# Patient Record
Sex: FEMALE | Race: WHITE
[De-identification: names, ages, dates, MRNs, and addresses within clinical notes are randomized per-mention and may not be internally consistent; named-entity substitution may affect disease eponyms.]

---

## 2017-03-22 ENCOUNTER — HOSPITAL ENCOUNTER (OUTPATIENT)
Dept: HOSPITAL 84 - D.ER | Age: 57
Setting detail: OBSERVATION
LOS: 1 days | Discharge: HOME | End: 2017-03-23
Attending: FAMILY MEDICINE | Admitting: FAMILY MEDICINE
Payer: MEDICARE

## 2017-03-22 VITALS — SYSTOLIC BLOOD PRESSURE: 131 MMHG | DIASTOLIC BLOOD PRESSURE: 81 MMHG

## 2017-03-22 VITALS — SYSTOLIC BLOOD PRESSURE: 153 MMHG | DIASTOLIC BLOOD PRESSURE: 95 MMHG

## 2017-03-22 VITALS — BODY MASS INDEX: 34.59 KG/M2 | BODY MASS INDEX: 34.59 KG/M2 | WEIGHT: 195.21 LBS | HEIGHT: 63 IN

## 2017-03-22 DIAGNOSIS — G89.29: ICD-10-CM

## 2017-03-22 DIAGNOSIS — E03.9: ICD-10-CM

## 2017-03-22 DIAGNOSIS — Z72.0: ICD-10-CM

## 2017-03-22 DIAGNOSIS — G47.33: ICD-10-CM

## 2017-03-22 DIAGNOSIS — M79.7: ICD-10-CM

## 2017-03-22 DIAGNOSIS — F31.9: ICD-10-CM

## 2017-03-22 DIAGNOSIS — J44.9: ICD-10-CM

## 2017-03-22 DIAGNOSIS — I10: Primary | ICD-10-CM

## 2017-03-22 DIAGNOSIS — F60.3: ICD-10-CM

## 2017-03-22 DIAGNOSIS — R00.2: ICD-10-CM

## 2017-03-22 DIAGNOSIS — F41.9: ICD-10-CM

## 2017-03-22 LAB
ANION GAP SERPL CALC-SCNC: 11.1 MMOL/L (ref 8–16)
APPEARANCE UR: CLEAR
BACTERIA #/AREA URNS HPF: (no result) /HPF
BASOPHILS NFR BLD AUTO: 0.4 % (ref 0–2)
BILIRUB SERPL-MCNC: NEGATIVE MG/DL
BUN SERPL-MCNC: 11 MG/DL (ref 7–18)
CALCIUM SERPL-MCNC: 8.9 MG/DL (ref 8.5–10.1)
CHLORIDE SERPL-SCNC: 103 MMOL/L (ref 98–107)
CK MB SERPL-MCNC: 0 U/L (ref 0–3.6)
CK MB SERPL-MCNC: 0.2 U/L (ref 0–3.6)
CK SERPL-CCNC: 15 UL (ref 21–215)
CK SERPL-CCNC: 20 UL (ref 21–215)
CK SERPL-CCNC: 40 UL (ref 21–215)
CO2 SERPL-SCNC: 33 MMOL/L (ref 21–32)
COLOR UR: YELLOW
CREAT SERPL-MCNC: 0.7 MG/DL (ref 0.6–1.3)
EOSINOPHIL NFR BLD: 2 % (ref 0–7)
ERYTHROCYTE [DISTWIDTH] IN BLOOD BY AUTOMATED COUNT: 13.5 % (ref 11.5–14.5)
GLUCOSE SERPL-MCNC: 105 MG/DL (ref 74–106)
GLUCOSE SERPL-MCNC: NEGATIVE MG/DL
HCT VFR BLD CALC: 39.5 % (ref 36–48)
HGB BLD-MCNC: 12.5 G/DL (ref 12–16)
IMM GRANULOCYTES NFR BLD: 0.2 % (ref 0–5)
KETONES UR STRIP-MCNC: NEGATIVE MG/DL
LEUKOCYTE ESTERASE: (no result)
LYMPHOCYTES NFR BLD AUTO: 26.7 % (ref 15–50)
MCH RBC QN AUTO: 30.4 PG (ref 26–34)
MCHC RBC AUTO-ENTMCNC: 31.6 G/DL (ref 31–37)
MCV RBC: 96.1 FL (ref 80–100)
MONOCYTES NFR BLD: 6.5 % (ref 2–11)
NEUTROPHILS NFR BLD AUTO: 64.2 % (ref 40–80)
NITRITE UR-MCNC: NEGATIVE MG/ML
OSMOLALITY SERPL CALC.SUM OF ELEC: 283 MOSM/KG (ref 275–300)
PH UR STRIP: 7 [PH] (ref 5–6)
PLATELET # BLD: 129 10X3/UL (ref 130–400)
PMV BLD AUTO: 11.1 FL (ref 7.4–10.4)
POTASSIUM SERPL-SCNC: 4.1 MMOL/L (ref 3.5–5.1)
PROT UR-MCNC: NEGATIVE MG/DL
RBC # BLD AUTO: 4.11 10X6/UL (ref 4–5.4)
RBC #/AREA URNS HPF: (no result) /HPF (ref 0–5)
SODIUM SERPL-SCNC: 143 MMOL/L (ref 136–145)
SP GR UR STRIP: 1.01 (ref 1–1.02)
TROPONIN I SERPL-MCNC: < 0.017 NG/ML (ref 0–0.06)
UROBILINOGEN UR-MCNC: NORMAL MG/DL
WBC # BLD AUTO: 5.1 10X3/UL (ref 4.8–10.8)
WBC #/AREA URNS HPF: (no result) /HPF (ref 0–5)

## 2017-03-22 NOTE — NUR
PT AWAKE, ALERT, ORIENTED, CONCERNED THAT SHE HAS NOT RECEIVED ANY OF HER
MEDICATIONS. I HAVE PAGED ALVARO OSEI, ON CALL FOR HEALTHAR FOR ORDERS. PT
IS REQUESTING HER ABILIFY, LORAZEPAM, NORCO, AND MELATONIN. WILL CONTINUE TO
MONITOR CLOSELY. BED LOW, CALL LIGHT IN REACH, SIDE RAILS X 2, HOB 30 DEGREES.
PT STATES SHE FEELS HER HEART FLUTTERING, HOWEVER WHEN CHECKED WITH TELEMETRY
MONITOR, SHE IS 80'S NS.

## 2017-03-23 VITALS — SYSTOLIC BLOOD PRESSURE: 116 MMHG | DIASTOLIC BLOOD PRESSURE: 77 MMHG

## 2017-03-23 VITALS — DIASTOLIC BLOOD PRESSURE: 93 MMHG | SYSTOLIC BLOOD PRESSURE: 135 MMHG

## 2017-03-23 VITALS — SYSTOLIC BLOOD PRESSURE: 100 MMHG | DIASTOLIC BLOOD PRESSURE: 71 MMHG

## 2017-03-23 VITALS — SYSTOLIC BLOOD PRESSURE: 131 MMHG | DIASTOLIC BLOOD PRESSURE: 69 MMHG

## 2017-03-23 LAB
CK MB SERPL-MCNC: 0.1 U/L (ref 0–3.6)
CK SERPL-CCNC: 16 UL (ref 21–215)
MAGNESIUM SERPL-MCNC: 2 MG/DL (ref 1.8–2.4)
PHOSPHATE SERPL-MCNC: 5.3 MG/DL (ref 2.5–4.9)
POTASSIUM SERPL-SCNC: 4.2 MMOL/L (ref 3.5–5.1)
TROPONIN I SERPL-MCNC: < 0.017 NG/ML (ref 0–0.06)

## 2017-03-23 NOTE — NUR
DISCHARGE INSTRUCTIONS GIVEN TO PATIENT WAITING ON [facility]. UNDERSTANDING
INSTRUCTIONS. TO  VIA WC.

## 2017-03-23 NOTE — NUR
Patient Name: JEANNINE BENDER Admission Status: ER
Accout number: [id number] Admission Date: 2017
: 1960 Admission Diagnosis:
Attending: LEANN Current LOS: 1
 
Anticipated DC Date: 2017
Planned Disposition: Home
Primary Insurance: MEDICARE A & B
 
 
Discharge Planning Comments:
* Is the patient Alert and Oriented? Yes 0
* How many steps to enter\exit or inside your home? 2 0
* PCP DR. PARRY 0
* Pharmacy [facility] IN [place] 0
* Preadmission Environment Home with Family 0
* ADLs Independent 0
* Equipment Nebulizer
Oxygen 0
* Other Equipment HOME OXYGEN ONLY
[facility] - MEDICAL EQUIPMENT PROVIDER PREFERENCE 0
* List name and contact numbers for known caregivers / representatives who
currently or will assist patient after discharge: HELLEN BENDER, COUSIN,
[phone] 0
* Community resources currently utilized Other 0
* Please name any agencies selected above. [facility] 0
* Additional services required to return to the preadmission environment? No 0
* Can the patient safely return to the preadmission environment? Yes 0
* Has this patient been hospitalized within the prior 30 days at any hospital?
No 0
 
CM MET WITH PT IN ROOM TO DISCUSS DISCHARGE PLANNING AND NEEDS. PT REPORTS
LIVING AT HOME INDEPENDENTLY WITH HER COUSIN. PT HAS NEBULIZER AND HOME OXYGEN
FROM [facility]. PT HAS NO OUTSIDE SERVICES ASSISTING IN THE HOME. CM DISCUSSED
AVAILABILITY OF HOME HEALTH, REHAB SERVICES AND MEDICAL EQUIPMENT. PT DENIES
DISCHARGE NEEDS, REPORTS HAVING NO WAY HOME FOR DISCHARGE TODAY. PT HAS CALLED
HER COUSIN WHO CANNOT FIND ANYONE TO PICK HER UP. PT USES [facility] FOR HER MEDICAL
APPOINTMENTS. CM CALLED [facility], [phone], SPOKE TO LUI WHO
ARRANGED TRANSPORTATION FOR TODAY BETWEEN 2PM AND 3PM, CONFIRMATION NUMBER
[id number]. PT AND BEDSIDE NURSE NOTIFIED. NO FURTHER DISCHARGE NEEDS IDENTIFIED.
 
: Oumar Garcia

## 2017-03-29 NOTE — EC
PATIENT:JEANNINE BENDER             DATE OF SERVICE: 03/22/17
SEX: F                                  MEDICAL RECORD: [id number]
DATE OF BIRTH: [birth year]                        LOCATION:D.M2      D.213
AGE OF PATIENT: 57                             ADMISSION DATE: 03/22/17
 
REFERRING PHYSICIAN:                               
 
INTERPRETING PHYSICIAN: MICHAEL BACH MD          
 
 
 
                             ECHOCARDIOGRAM REPORT
  ECHO CHARGES 4               ECHO COMPLETE            
 
 
 
CLINICAL DIAGNOSIS: HTN/LVH/PALPITATIONS          
 
                         ECHOCARDIOGRAPHIC MEASUREMENTS
      (adult normal given)
        AC root (d.<3.7cm) 3.2    LV Septum d (<1.2 cm> 1.9 
           Valve Excursion 1.0      LV Septum (systole) 2.2 
     Left Atria (s.<4.0cm> 4.1           LVPW d(<1.2cm) 1.7 
             RV (d.<2.3cm) 3.2            LVPW (sytole) 1.9 
       LV diastole(<5.6CM) 4.3        MV E-F(>70mm/sec)     
                LV systole 3.1            LVOT Diameter 1.6 
            MV exc.(>10mm) 1.3 
Est.ejection fraction (50-75%)     Pericardial Effusion N
 
   DOPPLER:
     LVIT       A 81.0 E 56.0
       LA      RVSP 19  
     LVOT 89   AOP1/2T     
  Asc. Ao 68  
     RVOT 128 
       RA     
        
 AV Gradient Peak 6.59  AV Mean 3.61  AV Area 1.4 
 MV Gradient Peak 3.36  MV Mean 1.13  MV Area     
   COMMENTS:                                              
 
 
 Cardiac Sonographer: Pritesh SARABIA              
      Cardiologist:10         Dr. Almeida             
             TAPE# PACS           
                                                                                     
 
 
DATE OF SERVICE:  03/23/2017
 
Adequate 2D echo, color flow Doppler and M-Mode.  LVH present.  LV internal
dimensions are normal.  Wall motion is normal.  EF is greater than ____.  Aortic
valve is tricuspid.  No stenosis by Doppler interrogation.  The left atrium is
normal.  Mitral valve shows no prolapse.  Trace MR.  Right-sided chamber is
normal.  Trace TR.
 
TRANSINT:[id number] Voice Confirmation ID: [id number] DOCUMENT ID: [id number]
 
 
 
ECHOCARDIOGRAM REPORT                          [id number]    JEANNINE BENDERMICHAEL BURR MD          
 
 
 
Electronically Signed by MICHAEL BACH on 03/29/17 at 0842
 
 
 
 
 
 
 
 
 
 
 
 
 
 
 
 
 
 
 
 
 
 
 
 
 
 
 
 
 
 
 
 
 
 
 
 
 
 
 
 
CC:                                                             [id number]
DICTATION DATE: 03/23/17 1314     :     03/24/17 0101      DIS IN  
                                                                      03/23/17
[facility]                                          
[address] [place] [address]

## 2017-04-04 ENCOUNTER — HOSPITAL ENCOUNTER (EMERGENCY)
Dept: HOSPITAL 84 - D.ER | Age: 57
Discharge: HOME | End: 2017-04-04
Payer: MEDICARE

## 2017-04-04 VITALS — BODY MASS INDEX: 34.6 KG/M2

## 2017-04-04 DIAGNOSIS — F17.200: ICD-10-CM

## 2017-04-04 DIAGNOSIS — I10: Primary | ICD-10-CM

## 2017-04-04 DIAGNOSIS — R51: ICD-10-CM

## 2017-04-04 DIAGNOSIS — J44.9: ICD-10-CM

## 2017-04-04 LAB
ALBUMIN SERPL-MCNC: 3.5 G/DL (ref 3.4–5)
ALP SERPL-CCNC: 92 U/L (ref 46–116)
ALT SERPL-CCNC: 35 U/L (ref 10–68)
ANION GAP SERPL CALC-SCNC: 8.6 MMOL/L (ref 8–16)
BASOPHILS NFR BLD AUTO: 0.5 % (ref 0–2)
BILIRUB SERPL-MCNC: 0.38 MG/DL (ref 0.2–1.3)
BUN SERPL-MCNC: 8 MG/DL (ref 7–18)
CALCIUM SERPL-MCNC: 8.6 MG/DL (ref 8.5–10.1)
CHLORIDE SERPL-SCNC: 104 MMOL/L (ref 98–107)
CO2 SERPL-SCNC: 33.4 MMOL/L (ref 21–32)
CREAT SERPL-MCNC: 0.7 MG/DL (ref 0.6–1.3)
EOSINOPHIL NFR BLD: 2.1 % (ref 0–7)
ERYTHROCYTE [DISTWIDTH] IN BLOOD BY AUTOMATED COUNT: 13.5 % (ref 11.5–14.5)
GLOBULIN SER-MCNC: 3.5 G/L
GLUCOSE SERPL-MCNC: 105 MG/DL (ref 74–106)
HCT VFR BLD CALC: 38 % (ref 36–48)
HGB BLD-MCNC: 12.1 G/DL (ref 12–16)
IMM GRANULOCYTES NFR BLD: 0.2 % (ref 0–5)
LYMPHOCYTES NFR BLD AUTO: 21.7 % (ref 15–50)
MCH RBC QN AUTO: 30.6 PG (ref 26–34)
MCHC RBC AUTO-ENTMCNC: 31.8 G/DL (ref 31–37)
MCV RBC: 96 FL (ref 80–100)
MONOCYTES NFR BLD: 4.6 % (ref 2–11)
NEUTROPHILS NFR BLD AUTO: 70.9 % (ref 40–80)
OSMOLALITY SERPL CALC.SUM OF ELEC: 280 MOSM/KG (ref 275–300)
PLATELET # BLD: 137 10X3/UL (ref 130–400)
PMV BLD AUTO: 11.3 FL (ref 7.4–10.4)
POTASSIUM SERPL-SCNC: 4 MMOL/L (ref 3.5–5.1)
PROT SERPL-MCNC: 7 G/DL (ref 6.4–8.2)
RBC # BLD AUTO: 3.96 10X6/UL (ref 4–5.4)
SODIUM SERPL-SCNC: 142 MMOL/L (ref 136–145)
WBC # BLD AUTO: 5.6 10X3/UL (ref 4.8–10.8)

## 2017-04-05 ENCOUNTER — HOSPITAL ENCOUNTER (OUTPATIENT)
Dept: HOSPITAL 84 - D.ER | Age: 57
Setting detail: OBSERVATION
LOS: 1 days | Discharge: HOME | End: 2017-04-06
Attending: FAMILY MEDICINE | Admitting: FAMILY MEDICINE
Payer: MEDICARE

## 2017-04-05 VITALS
BODY MASS INDEX: 34.55 KG/M2 | HEIGHT: 63 IN | WEIGHT: 195 LBS | BODY MASS INDEX: 34.55 KG/M2 | HEIGHT: 63 IN | WEIGHT: 195 LBS

## 2017-04-05 VITALS — SYSTOLIC BLOOD PRESSURE: 110 MMHG | DIASTOLIC BLOOD PRESSURE: 71 MMHG

## 2017-04-05 VITALS — DIASTOLIC BLOOD PRESSURE: 71 MMHG | SYSTOLIC BLOOD PRESSURE: 110 MMHG

## 2017-04-05 VITALS — SYSTOLIC BLOOD PRESSURE: 137 MMHG | DIASTOLIC BLOOD PRESSURE: 70 MMHG

## 2017-04-05 DIAGNOSIS — F41.9: ICD-10-CM

## 2017-04-05 DIAGNOSIS — J45.909: ICD-10-CM

## 2017-04-05 DIAGNOSIS — I10: ICD-10-CM

## 2017-04-05 DIAGNOSIS — G89.29: ICD-10-CM

## 2017-04-05 DIAGNOSIS — G47.33: ICD-10-CM

## 2017-04-05 DIAGNOSIS — F31.9: ICD-10-CM

## 2017-04-05 DIAGNOSIS — F60.3: ICD-10-CM

## 2017-04-05 DIAGNOSIS — I95.2: Primary | ICD-10-CM

## 2017-04-05 DIAGNOSIS — M79.7: ICD-10-CM

## 2017-04-05 DIAGNOSIS — R41.82: ICD-10-CM

## 2017-04-05 DIAGNOSIS — J44.9: ICD-10-CM

## 2017-04-05 DIAGNOSIS — E03.9: ICD-10-CM

## 2017-04-05 LAB
ALBUMIN SERPL-MCNC: 3.5 G/DL (ref 3.4–5)
ALP SERPL-CCNC: 90 U/L (ref 46–116)
ALT SERPL-CCNC: 35 U/L (ref 10–68)
AMPHETAMINES UR QL SCN: NEGATIVE QUAL
ANION GAP SERPL CALC-SCNC: 6.8 MMOL/L (ref 8–16)
APPEARANCE UR: (no result)
BACTERIA #/AREA URNS HPF: (no result) /HPF
BARBITURATES UR QL SCN: NEGATIVE QUAL
BASOPHILS NFR BLD AUTO: 0.2 % (ref 0–2)
BENZODIAZ UR QL SCN: POSITIVE QUAL
BILIRUB SERPL-MCNC: 0.28 MG/DL (ref 0.2–1.3)
BILIRUB SERPL-MCNC: NEGATIVE MG/DL
BUN SERPL-MCNC: 12 MG/DL (ref 7–18)
BZE UR QL SCN: NEGATIVE QUAL
CALCIUM SERPL-MCNC: 8.3 MG/DL (ref 8.5–10.1)
CANNABINOIDS UR QL SCN: NEGATIVE QUAL
CHLORIDE SERPL-SCNC: 103 MMOL/L (ref 98–107)
CO2 SERPL-SCNC: 35.3 MMOL/L (ref 21–32)
COLOR UR: YELLOW
CREAT SERPL-MCNC: 0.8 MG/DL (ref 0.6–1.3)
EOSINOPHIL NFR BLD: 2.4 % (ref 0–7)
ERYTHROCYTE [DISTWIDTH] IN BLOOD BY AUTOMATED COUNT: 14 % (ref 11.5–14.5)
ETHANOL SERPL-MCNC: 0 MG/DL (ref 0–10)
GLOBULIN SER-MCNC: 3.5 G/L
GLUCOSE SERPL-MCNC: 109 MG/DL (ref 74–106)
GLUCOSE SERPL-MCNC: NEGATIVE MG/DL
GRAN CASTS #/AREA URNS LPF: (no result) /LPF
HCT VFR BLD CALC: 38 % (ref 36–48)
HGB BLD-MCNC: 11.8 G/DL (ref 12–16)
IMM GRANULOCYTES NFR BLD: 0.2 % (ref 0–5)
KETONES UR STRIP-MCNC: NEGATIVE MG/DL
LEUKOCYTE ESTERASE: (no result)
LYMPHOCYTES NFR BLD AUTO: 29.6 % (ref 15–50)
MCH RBC QN AUTO: 30.6 PG (ref 26–34)
MCHC RBC AUTO-ENTMCNC: 31.1 G/DL (ref 31–37)
MCV RBC: 98.7 FL (ref 80–100)
MONOCYTES NFR BLD: 5.8 % (ref 2–11)
MUCOUS THREADS #/AREA URNS LPF: (no result) /LPF
NEUTROPHILS NFR BLD AUTO: 61.8 % (ref 40–80)
NITRITE UR-MCNC: NEGATIVE MG/ML
OPIATES UR QL SCN: POSITIVE QUAL
OSMOLALITY SERPL CALC.SUM OF ELEC: 281 MOSM/KG (ref 275–300)
PCP UR QL SCN: NEGATIVE QUAL
PH UR STRIP: 5 [PH] (ref 5–6)
PLATELET # BLD: 135 10X3/UL (ref 130–400)
PMV BLD AUTO: 11.3 FL (ref 7.4–10.4)
POTASSIUM SERPL-SCNC: 4.1 MMOL/L (ref 3.5–5.1)
PROT SERPL-MCNC: 7 G/DL (ref 6.4–8.2)
PROT UR-MCNC: NEGATIVE MG/DL
RBC # BLD AUTO: 3.85 10X6/UL (ref 4–5.4)
SODIUM SERPL-SCNC: 141 MMOL/L (ref 136–145)
SP GR UR STRIP: 1.01 (ref 1–1.02)
SQUAMOUS #/AREA URNS HPF: (no result) /HPF (ref 0–5)
T4 FREE SERPL-MCNC: 1.12 NG/DL (ref 0.76–1.46)
T4 SERPL-MCNC: 9.3 UG/DL (ref 4.7–13.3)
TSH SERPL-ACNC: 0.43 UIU/ML (ref 0.36–3.74)
UDS - METH: NEGATIVE QUAL
UROBILINOGEN UR-MCNC: 1 MG/DL
WBC # BLD AUTO: 5 10X3/UL (ref 4.8–10.8)
WBC #/AREA URNS HPF: (no result) /HPF (ref 0–5)

## 2017-04-05 NOTE — NUR
RECEIVED TO ROOM 2215 FROM ER VIA . ORIENTED TO ROOM AND CALL LIGHT SYSTEM.
VSS. CALL LIGHT IN REACH. WILL CONTINUE WITH PLAN OF CARE.

## 2017-04-05 NOTE — NUR
NORCO PO PER C/O PAIN OF 8 IN LOWER BACK. NO OTHER CHANGES IN INITIAL
ASSESSMENT. STILL REFUSES SCDs. PASSWORD OBTAINED AND PLACED IN CHART. CALL
LIGHT IN REACH. WILL CONTINUE WITH PLAN OF CARE.

## 2017-04-06 VITALS — DIASTOLIC BLOOD PRESSURE: 97 MMHG | SYSTOLIC BLOOD PRESSURE: 189 MMHG

## 2017-04-06 VITALS — SYSTOLIC BLOOD PRESSURE: 171 MMHG | DIASTOLIC BLOOD PRESSURE: 97 MMHG

## 2017-04-06 VITALS — SYSTOLIC BLOOD PRESSURE: 149 MMHG | DIASTOLIC BLOOD PRESSURE: 78 MMHG

## 2017-04-06 VITALS — DIASTOLIC BLOOD PRESSURE: 95 MMHG | SYSTOLIC BLOOD PRESSURE: 190 MMHG

## 2017-04-06 LAB
ALBUMIN SERPL-MCNC: 2.9 G/DL (ref 3.4–5)
ALP SERPL-CCNC: 80 U/L (ref 46–116)
ALT SERPL-CCNC: 32 U/L (ref 10–68)
ANION GAP SERPL CALC-SCNC: 9.1 MMOL/L (ref 8–16)
BASOPHILS NFR BLD AUTO: 0.3 % (ref 0–2)
BILIRUB SERPL-MCNC: 0.3 MG/DL (ref 0.2–1.3)
BUN SERPL-MCNC: 10 MG/DL (ref 7–18)
CALCIUM SERPL-MCNC: 8.4 MG/DL (ref 8.5–10.1)
CHLORIDE SERPL-SCNC: 103 MMOL/L (ref 98–107)
CO2 SERPL-SCNC: 32.7 MMOL/L (ref 21–32)
CREAT SERPL-MCNC: 0.6 MG/DL (ref 0.6–1.3)
EOSINOPHIL NFR BLD: 2.3 % (ref 0–7)
ERYTHROCYTE [DISTWIDTH] IN BLOOD BY AUTOMATED COUNT: 13.7 % (ref 11.5–14.5)
GLOBULIN SER-MCNC: 3.2 G/L
GLUCOSE SERPL-MCNC: 109 MG/DL (ref 74–106)
HCT VFR BLD CALC: 35.6 % (ref 36–48)
HGB BLD-MCNC: 11.3 G/DL (ref 12–16)
IMM GRANULOCYTES NFR BLD: 0.3 % (ref 0–5)
LYMPHOCYTES NFR BLD AUTO: 33.4 % (ref 15–50)
MCH RBC QN AUTO: 31 PG (ref 26–34)
MCHC RBC AUTO-ENTMCNC: 31.7 G/DL (ref 31–37)
MCV RBC: 97.8 FL (ref 80–100)
MONOCYTES NFR BLD: 4.6 % (ref 2–11)
NEUTROPHILS NFR BLD AUTO: 59.1 % (ref 40–80)
OSMOLALITY SERPL CALC.SUM OF ELEC: 280 MOSM/KG (ref 275–300)
PLATELET # BLD: 116 10X3/UL (ref 130–400)
PMV BLD AUTO: 11.4 FL (ref 7.4–10.4)
POTASSIUM SERPL-SCNC: 3.8 MMOL/L (ref 3.5–5.1)
PROT SERPL-MCNC: 6.1 G/DL (ref 6.4–8.2)
RBC # BLD AUTO: 3.64 10X6/UL (ref 4–5.4)
SODIUM SERPL-SCNC: 141 MMOL/L (ref 136–145)
WBC # BLD AUTO: 3.5 10X3/UL (ref 4.8–10.8)

## 2017-04-06 NOTE — NUR
ASSESSMENT PER FLOW SHEET.PT VERY HARD TO WAKE UP THIS AM.SHE OPENS HER EYES
INT.SHE IS WITHOUT SIGNS OF PAIN OR DISTRESS.BRUISES AND SCRATCHES NOTED TO
BILATERAL ARMS.CALL LIGHT IN REACH.MONITOR

## 2017-04-06 NOTE — NUR
CM CALLED THE [facility] FOR PATIENT AND THEY ARE PICKING UP ASAP AFTER 3:30.
PATIENT IS READY AND NURSE (ION) AWARE.

## 2017-04-06 NOTE — NUR
DISCHARGE INSTRUCTIONS,STATES UNDERSTANDING.IV DCD WITH CATH INTACT.PRINCE
CALLED [facility] TO PICK PT UP FOR TRANSPORT.

## 2017-04-19 ENCOUNTER — HOSPITAL ENCOUNTER (OUTPATIENT)
Dept: HOSPITAL 84 - D.OPS | Age: 57
Discharge: HOME | End: 2017-04-19
Attending: INTERNAL MEDICINE
Payer: MEDICARE

## 2017-04-19 VITALS — SYSTOLIC BLOOD PRESSURE: 138 MMHG | DIASTOLIC BLOOD PRESSURE: 92 MMHG

## 2017-04-19 VITALS — WEIGHT: 196.41 LBS | BODY MASS INDEX: 34.8 KG/M2 | HEIGHT: 63 IN

## 2017-04-19 DIAGNOSIS — E03.9: ICD-10-CM

## 2017-04-19 DIAGNOSIS — E66.9: ICD-10-CM

## 2017-04-19 DIAGNOSIS — R10.9: ICD-10-CM

## 2017-04-19 DIAGNOSIS — J45.909: ICD-10-CM

## 2017-04-19 DIAGNOSIS — K44.9: ICD-10-CM

## 2017-04-19 DIAGNOSIS — R11.0: ICD-10-CM

## 2017-04-19 DIAGNOSIS — I10: ICD-10-CM

## 2017-04-19 DIAGNOSIS — K21.0: ICD-10-CM

## 2017-04-19 DIAGNOSIS — R13.10: Primary | ICD-10-CM

## 2017-04-19 DIAGNOSIS — G47.30: ICD-10-CM

## 2017-04-19 LAB
ERYTHROCYTE [DISTWIDTH] IN BLOOD BY AUTOMATED COUNT: 13.3 % (ref 11.5–14.5)
HCT VFR BLD CALC: 41 % (ref 36–48)
HGB BLD-MCNC: 13.4 G/DL (ref 12–16)
MCH RBC QN AUTO: 31.1 PG (ref 26–34)
MCHC RBC AUTO-ENTMCNC: 32.7 G/DL (ref 31–37)
MCV RBC: 95.1 FL (ref 80–100)
PLATELET # BLD: 160 10X3/UL (ref 130–400)
PMV BLD AUTO: 12.1 FL (ref 7.4–10.4)
RBC # BLD AUTO: 4.31 10X6/UL (ref 4–5.4)
WBC # BLD AUTO: 4.9 10X3/UL (ref 4.8–10.8)

## 2017-04-19 NOTE — NUR
1220--PT VOIDS WITHOUT DIFFICULTY, IV DC'D. PT UP TP DRESS AT THIS
TIME. LAURA JONES
 
1240--DISCHARGE INSTRUCTIONS GIVEN, PT VERBALIZES UNDERSTANDING. PT
OFF UNIT VIA WC. LAURA JONES

## 2017-04-19 NOTE — NUR
1140 NORCO 10MG PO GIVEN FOR BACK/LEG PAIN RATED 8/10 AS ORDERED. PT.
DRINKING COLA. PT. HAS FAMILY IN ROOM

## 2017-04-21 ENCOUNTER — HOSPITAL ENCOUNTER (EMERGENCY)
Dept: HOSPITAL 84 - D.ER | Age: 57
Discharge: HOME | End: 2017-04-21
Payer: MEDICARE

## 2017-04-21 VITALS — BODY MASS INDEX: 34.8 KG/M2

## 2017-04-21 DIAGNOSIS — E86.0: Primary | ICD-10-CM

## 2017-04-21 DIAGNOSIS — M79.7: ICD-10-CM

## 2017-04-21 DIAGNOSIS — I10: ICD-10-CM

## 2017-04-21 DIAGNOSIS — J44.9: ICD-10-CM

## 2017-04-21 DIAGNOSIS — R53.1: ICD-10-CM

## 2017-04-21 DIAGNOSIS — F32.9: ICD-10-CM

## 2017-04-21 LAB
ALBUMIN SERPL-MCNC: 3.2 G/DL (ref 3.4–5)
ALP SERPL-CCNC: 95 U/L (ref 46–116)
ALT SERPL-CCNC: 38 U/L (ref 10–68)
ANION GAP SERPL CALC-SCNC: 12.3 MMOL/L (ref 8–16)
APPEARANCE UR: CLEAR
BACTERIA #/AREA URNS HPF: (no result) /HPF
BASOPHILS NFR BLD AUTO: 0.2 % (ref 0–2)
BILIRUB SERPL-MCNC: 0.48 MG/DL (ref 0.2–1.3)
BILIRUB SERPL-MCNC: NEGATIVE MG/DL
BUN SERPL-MCNC: 9 MG/DL (ref 7–18)
CALCIUM SERPL-MCNC: 7.8 MG/DL (ref 8.5–10.1)
CHLORIDE SERPL-SCNC: 105 MMOL/L (ref 98–107)
CO2 SERPL-SCNC: 27.1 MMOL/L (ref 21–32)
COLOR UR: YELLOW
CREAT SERPL-MCNC: 0.9 MG/DL (ref 0.6–1.3)
EOSINOPHIL NFR BLD: 2.9 % (ref 0–7)
ERYTHROCYTE [DISTWIDTH] IN BLOOD BY AUTOMATED COUNT: 13.2 % (ref 11.5–14.5)
GLOBULIN SER-MCNC: 3.5 G/L
GLUCOSE SERPL-MCNC: 140 MG/DL (ref 74–106)
GLUCOSE SERPL-MCNC: NEGATIVE MG/DL
HCT VFR BLD CALC: 40.4 % (ref 36–48)
HGB BLD-MCNC: 13.1 G/DL (ref 12–16)
IMM GRANULOCYTES NFR BLD: 0 % (ref 0–5)
KETONES UR STRIP-MCNC: NEGATIVE MG/DL
LEUKOCYTE ESTERASE: (no result)
LYMPHOCYTES NFR BLD AUTO: 27.3 % (ref 15–50)
MCH RBC QN AUTO: 31.3 PG (ref 26–34)
MCHC RBC AUTO-ENTMCNC: 32.4 G/DL (ref 31–37)
MCV RBC: 96.7 FL (ref 80–100)
MONOCYTES NFR BLD: 6.9 % (ref 2–11)
NEUTROPHILS NFR BLD AUTO: 62.7 % (ref 40–80)
NITRITE UR-MCNC: NEGATIVE MG/ML
OSMOLALITY SERPL CALC.SUM OF ELEC: 281 MOSM/KG (ref 275–300)
PH UR STRIP: 5 [PH] (ref 5–6)
PLATELET # BLD: 150 10X3/UL (ref 130–400)
PMV BLD AUTO: 11.3 FL (ref 7.4–10.4)
POTASSIUM SERPL-SCNC: 3.4 MMOL/L (ref 3.5–5.1)
PROT SERPL-MCNC: 6.7 G/DL (ref 6.4–8.2)
PROT UR-MCNC: (no result) MG/DL
RBC # BLD AUTO: 4.18 10X6/UL (ref 4–5.4)
RBC #/AREA URNS HPF: (no result) /HPF (ref 0–5)
SODIUM SERPL-SCNC: 141 MMOL/L (ref 136–145)
SP GR UR STRIP: 1.01 (ref 1–1.02)
SQUAMOUS #/AREA URNS HPF: (no result) /HPF (ref 0–5)
UROBILINOGEN UR-MCNC: NORMAL MG/DL
WBC # BLD AUTO: 4.5 10X3/UL (ref 4.8–10.8)
WBC #/AREA URNS HPF: (no result) /HPF (ref 0–5)

## 2017-04-24 NOTE — OP
PATIENT NAME:  JEANNINE BENDER                       MEDICAL RECORD: [id number]
:60                                             LOCATION:D.OPS          
                                                         ADMISSION DATE:        
SURGEON:  REMIGIO CHEN DO             
 
 
DATE OF OPERATION:  2017
 
PROCEDURE:  EGD with biopsies.
 
SCOPE:  Olympus video gastroscope.
 
MEDICATIONS:  Propofol 200 mg IV per anesthesia.
 
INDICATIONS:  Dysphagia, heartburn, nausea, generalized abdominal pain.
 
FINDINGS:  Informed consent was given.  The patient was made comfortable with
the above medication.  After reaching an adequate level of sedation by slow IV
push, the patient was placed on her left side.  The endoscope was then advanced
under direct visualization to the second portion of the duodenum.  In the
proximal third of the esophagus, she did have 2 patches of heterotopic gastric
mucosa.  The middle and distal thirds of the esophagus appeared normal.  At the
GE junction, there was some very mild LA class A reflux-induced esophagitis at
the GE junction.  A small sliding hiatal hernia could be visualized from the
distal esophagus.  Endoscope was advanced through the GE junction and
retroflexed to confirm that there was a small sliding hiatal hernia involving
the cardia.  In the stomach, there was diffuse granularity and erythema
consistent with possible gastritis.  Random biopsies were taken from the body,
incisura, and antrum of the stomach.  They were sent for histology and to rule
out H. pylori.  The endoscope was advanced through the pylorus into the duodenum
where the bulb and second portion of the duodenum appeared normal.  The
endoscope was then withdrawn from the patient.  The patient tolerated the
procedure well and there were no complications.
 
ESTIMATED BLOOD LOSS:  Less than 3 cc.
 
IMPRESSION:
1.  Heterotopic gastric mucosa present in the proximal third of the esophagus.
2.  Mild LA class A reflux-induced esophagitis at the GE junction.
3.  Small sliding hiatal hernia involving the cardia of the stomach.
4.  Diffuse gastric granularity and erythema consistent with possible gastritis,
biopsies taken.
 
PLAN AND RECOMMENDATIONS:
1.  Discharge home when recovery parameters are met.
2.  GERD diet and reflux precautions.
3.  Increase omeprazole to 40 mg daily times 8 weeks and decrease back to 20 mg
daily with additional supplementation of Zantac 150 mg q.h.s. while taking
omeprazole in the morning if needed for symptoms.
4.  Barium esophagram regarding the dysphagia.
 
TRANSINT:[id number] Voice Confirmation ID: [id number] DOCUMENT ID: [id number]
 
 
 
OPERATIVE REPORT                               [id number]    JEANNINE BENDER NATHAN A DO             
 
 
 
Electronically Signed by REMIGIO CURRIE on 17 at 1301
 
 
 
 
 
 
 
 
 
 
 
 
 
 
 
 
 
 
 
 
 
 
 
 
 
 
 
 
 
 
 
 
 
 
 
 
 
 
 
 
 
CC:                                                             [id number]
DICTATION DATE: 17 1100     :     17 1305      [facility] 
                                                                      17
[place] [id number] [place] [id number]

## 2017-04-28 ENCOUNTER — HOSPITAL ENCOUNTER (OUTPATIENT)
Dept: HOSPITAL 84 - D.ER | Age: 57
Setting detail: OBSERVATION
LOS: 3 days | Discharge: HOME | End: 2017-05-01
Attending: FAMILY MEDICINE | Admitting: FAMILY MEDICINE
Payer: MEDICARE

## 2017-04-28 VITALS
WEIGHT: 198.73 LBS | BODY MASS INDEX: 35.21 KG/M2 | HEIGHT: 63 IN | BODY MASS INDEX: 35.21 KG/M2 | HEIGHT: 63 IN | WEIGHT: 198.73 LBS

## 2017-04-28 DIAGNOSIS — J44.9: ICD-10-CM

## 2017-04-28 DIAGNOSIS — E03.9: ICD-10-CM

## 2017-04-28 DIAGNOSIS — F60.3: ICD-10-CM

## 2017-04-28 DIAGNOSIS — F31.9: ICD-10-CM

## 2017-04-28 DIAGNOSIS — Z87.891: ICD-10-CM

## 2017-04-28 DIAGNOSIS — I95.9: Primary | ICD-10-CM

## 2017-04-28 DIAGNOSIS — M79.7: ICD-10-CM

## 2017-04-28 DIAGNOSIS — G47.33: ICD-10-CM

## 2017-04-28 LAB
ALBUMIN SERPL-MCNC: 3.4 G/DL (ref 3.4–5)
ALP SERPL-CCNC: 83 U/L (ref 46–116)
ALT SERPL-CCNC: 28 U/L (ref 10–68)
ANION GAP SERPL CALC-SCNC: 3.9 MMOL/L (ref 8–16)
BASOPHILS NFR BLD AUTO: 0.4 % (ref 0–2)
BILIRUB SERPL-MCNC: 0.27 MG/DL (ref 0.2–1.3)
BUN SERPL-MCNC: 14 MG/DL (ref 7–18)
CALCIUM SERPL-MCNC: 8.2 MG/DL (ref 8.5–10.1)
CHLORIDE SERPL-SCNC: 105 MMOL/L (ref 98–107)
CO2 SERPL-SCNC: 35.4 MMOL/L (ref 21–32)
CREAT SERPL-MCNC: 1 MG/DL (ref 0.6–1.3)
EOSINOPHIL NFR BLD: 2.3 % (ref 0–7)
ERYTHROCYTE [DISTWIDTH] IN BLOOD BY AUTOMATED COUNT: 13.5 % (ref 11.5–14.5)
GLOBULIN SER-MCNC: 3.2 G/L
GLUCOSE SERPL-MCNC: 100 MG/DL (ref 74–106)
HCT VFR BLD CALC: 39.6 % (ref 36–48)
HGB BLD-MCNC: 12.8 G/DL (ref 12–16)
IMM GRANULOCYTES NFR BLD: 0.1 % (ref 0–5)
LYMPHOCYTES NFR BLD AUTO: 34 % (ref 15–50)
MCH RBC QN AUTO: 31.2 PG (ref 26–34)
MCHC RBC AUTO-ENTMCNC: 32.3 G/DL (ref 31–37)
MCV RBC: 96.6 FL (ref 80–100)
MONOCYTES NFR BLD: 6.7 % (ref 2–11)
NEUTROPHILS NFR BLD AUTO: 56.5 % (ref 40–80)
OSMOLALITY SERPL CALC.SUM OF ELEC: 281 MOSM/KG (ref 275–300)
PLATELET # BLD: 155 10X3/UL (ref 130–400)
PMV BLD AUTO: 11.3 FL (ref 7.4–10.4)
POTASSIUM SERPL-SCNC: 3.3 MMOL/L (ref 3.5–5.1)
PROT SERPL-MCNC: 6.6 G/DL (ref 6.4–8.2)
RBC # BLD AUTO: 4.1 10X6/UL (ref 4–5.4)
SODIUM SERPL-SCNC: 141 MMOL/L (ref 136–145)
TROPONIN I SERPL-MCNC: < 0.017 NG/ML (ref 0–0.06)
TSH SERPL-ACNC: 1.31 UIU/ML (ref 0.36–3.74)
WBC # BLD AUTO: 6.9 10X3/UL (ref 4.8–10.8)

## 2017-04-29 VITALS — SYSTOLIC BLOOD PRESSURE: 104 MMHG | DIASTOLIC BLOOD PRESSURE: 62 MMHG

## 2017-04-29 VITALS — SYSTOLIC BLOOD PRESSURE: 134 MMHG | DIASTOLIC BLOOD PRESSURE: 80 MMHG

## 2017-04-29 VITALS — DIASTOLIC BLOOD PRESSURE: 62 MMHG | SYSTOLIC BLOOD PRESSURE: 104 MMHG

## 2017-04-29 VITALS — SYSTOLIC BLOOD PRESSURE: 150 MMHG | DIASTOLIC BLOOD PRESSURE: 88 MMHG

## 2017-04-29 VITALS — SYSTOLIC BLOOD PRESSURE: 116 MMHG | DIASTOLIC BLOOD PRESSURE: 52 MMHG

## 2017-04-29 VITALS — DIASTOLIC BLOOD PRESSURE: 77 MMHG | SYSTOLIC BLOOD PRESSURE: 152 MMHG

## 2017-04-29 VITALS — SYSTOLIC BLOOD PRESSURE: 129 MMHG | DIASTOLIC BLOOD PRESSURE: 76 MMHG

## 2017-04-29 LAB
CK MB SERPL-MCNC: 0.2 U/L (ref 0–3.6)
CK MB SERPL-MCNC: 0.3 U/L (ref 0–3.6)
CK SERPL-CCNC: 16 UL (ref 21–215)
CK SERPL-CCNC: 17 UL (ref 21–215)
TROPONIN I SERPL-MCNC: < 0.017 NG/ML (ref 0–0.06)
TROPONIN I SERPL-MCNC: < 0.017 NG/ML (ref 0–0.06)

## 2017-04-29 NOTE — NUR
RESTING ON RIGHT SIDE WITH EYES CLOSED SNORING LIGHTLY. AROUSES EASILY,
DENIES ANY NEEDS. NS INFUSING AT 100ML/HR TO LEFT AC.

## 2017-04-29 NOTE — NUR
DR. ALFARO CALLED BACK AND STATED HIS NURSE PRACTITIONER ALVARO WILL BE HERE
SHORTLY AND SHE WILL TAKE OF IT.

## 2017-04-29 NOTE — NUR
PATIENT REQUESTS PAIN MED SHE REFERS TO IT AS "MY HYDRO". PROVIDED PATIENT
NORCO AS PER ORDER, SHE RATES HER PAIN 8/10 IN BACK MOSTLY.

## 2017-04-29 NOTE — NUR
PRN MELATONIN REQUESTED BY PT HELD R/T PT SLEEPING WHEN I WENT IN TO
ADMINISTER IT. CONTINUE TO MONITOR CLOSELY.

## 2017-04-29 NOTE — NUR
PATIENT REQUESTS IV BE RESITED, MEENA BOSWELL RN IN TO LOOK AT RIGHT
FOREARM, ATTEMPTED X1 UNSUCCESSFUL. WILL GET ANOTHER MURSE TO TRY.

## 2017-04-29 NOTE — NUR
NURSE ROUNDS 20:00 - PT AWAKE, ALERT, ORIENTED, LYING IN BED REQUESTING HER
PRN PAIN MEDICATION AND BEDTIME MEDS. PT WAS HARD TO UNDERSTAND AS SHE WAS
SLURRING HER WORDS. I TOLD HER SHE COULD HAVE HER PRN NORCO IF HER B/P WAS
STABLE, AND SHE WAS NOT FEELING SYNCOPAL. PT AGREED. CONTINUE TO MONITOR
CLOSELY. BED LOW, CALL LIGHT IN REACH, SIDE RAILS X 2, HOB 20 DEGREES.

## 2017-04-29 NOTE — NUR
REC FROM ER VIA WC. ALERT/ORIENTED X 4. AMBULATED TO BED WITH STEADY GAIT. IV
IN L ARM INTACT SL. REQUESTED PAIN MEDICATION FOR C/O CHRONIC BACK PAIN LEVEL
7 ON NUMBER SCALE. EDUCATED ABOUT THE BLOOD PRESSURE LOWERING EFFECTS OF
HYDROCODONE. CHECKED HER B/P /62. PULSE AT 55. REQUESTED SOME ICE WATER
AND LIGHTS OFF TO SLEEP. ORIENTED TO ROOM AND CALL LIGHT.

## 2017-04-29 NOTE — NUR
LET PATIENT KNOW THAT I SPOKE TO DR. ALFARO AND NURSE PRACTITIONER WILL BE THE
ONE TO MAKE ROUNDS SOON.

## 2017-04-30 VITALS — DIASTOLIC BLOOD PRESSURE: 80 MMHG | SYSTOLIC BLOOD PRESSURE: 138 MMHG

## 2017-04-30 VITALS — SYSTOLIC BLOOD PRESSURE: 149 MMHG | DIASTOLIC BLOOD PRESSURE: 87 MMHG

## 2017-04-30 VITALS — DIASTOLIC BLOOD PRESSURE: 72 MMHG | SYSTOLIC BLOOD PRESSURE: 130 MMHG

## 2017-04-30 VITALS — SYSTOLIC BLOOD PRESSURE: 140 MMHG | DIASTOLIC BLOOD PRESSURE: 95 MMHG

## 2017-04-30 VITALS — SYSTOLIC BLOOD PRESSURE: 130 MMHG | DIASTOLIC BLOOD PRESSURE: 72 MMHG

## 2017-04-30 VITALS — SYSTOLIC BLOOD PRESSURE: 145 MMHG | DIASTOLIC BLOOD PRESSURE: 81 MMHG

## 2017-04-30 VITALS — DIASTOLIC BLOOD PRESSURE: 59 MMHG | SYSTOLIC BLOOD PRESSURE: 130 MMHG

## 2017-04-30 VITALS — SYSTOLIC BLOOD PRESSURE: 126 MMHG | DIASTOLIC BLOOD PRESSURE: 66 MMHG

## 2017-04-30 LAB
ANION GAP SERPL CALC-SCNC: 11.1 MMOL/L (ref 8–16)
BASOPHILS NFR BLD AUTO: 0.5 % (ref 0–2)
BUN SERPL-MCNC: 11 MG/DL (ref 7–18)
CALCIUM SERPL-MCNC: 8.2 MG/DL (ref 8.5–10.1)
CHLORIDE SERPL-SCNC: 107 MMOL/L (ref 98–107)
CK MB SERPL-MCNC: 2.9 U/L (ref 0–3.6)
CK SERPL-CCNC: 16 UL (ref 21–215)
CO2 SERPL-SCNC: 28.5 MMOL/L (ref 21–32)
CREAT SERPL-MCNC: 0.7 MG/DL (ref 0.6–1.3)
EOSINOPHIL NFR BLD: 3.3 % (ref 0–7)
ERYTHROCYTE [DISTWIDTH] IN BLOOD BY AUTOMATED COUNT: 13.3 % (ref 11.5–14.5)
GLUCOSE SERPL-MCNC: 127 MG/DL (ref 74–106)
HCT VFR BLD CALC: 38.5 % (ref 36–48)
HGB BLD-MCNC: 12.1 G/DL (ref 12–16)
IMM GRANULOCYTES NFR BLD: 0 % (ref 0–5)
LYMPHOCYTES NFR BLD AUTO: 39.3 % (ref 15–50)
MCH RBC QN AUTO: 30.4 PG (ref 26–34)
MCHC RBC AUTO-ENTMCNC: 31.4 G/DL (ref 31–37)
MCV RBC: 96.7 FL (ref 80–100)
MONOCYTES NFR BLD: 5 % (ref 2–11)
NEUTROPHILS NFR BLD AUTO: 51.9 % (ref 40–80)
OSMOLALITY SERPL CALC.SUM OF ELEC: 283 MOSM/KG (ref 275–300)
PLATELET # BLD: 117 10X3/UL (ref 130–400)
PMV BLD AUTO: 11.2 FL (ref 7.4–10.4)
POTASSIUM SERPL-SCNC: 4.6 MMOL/L (ref 3.5–5.1)
RBC # BLD AUTO: 3.98 10X6/UL (ref 4–5.4)
SODIUM SERPL-SCNC: 142 MMOL/L (ref 136–145)
TROPONIN I SERPL-MCNC: < 0.017 NG/ML (ref 0–0.06)
WBC # BLD AUTO: 4 10X3/UL (ref 4.8–10.8)

## 2017-04-30 NOTE — NUR
PT CALLED REQUESTING PRN PAIN MEDICATION AND WAS PROVIDED WITH IT. PT SITTING
UP IN BED EATING LUNCH. DENIES ANY FURTHER NEEDS AT THIS TIME. CL IN REACH.
WILL CPOC.

## 2017-04-30 NOTE — NUR
CHARTS CHECKED. SHIFT ASSESSMENT COMPLETED. INTRODUCED MYSELF TO PT AS PRIMARY
RN FOR TODAYS SHIFT. PT RESTING QUIETLY AND DENIES ANY CURRENT NEEDS AT THIS
TIME. CL IN REACH. WILL CTM.

## 2017-04-30 NOTE — NUR
PT UP TO BATHROOM WITH MINIMAL ASSIST. PT REQUESTING PRN PAIN MEDICATION.
DENIES ANY OTHER NEEDS. PT STILL WEARING O2 VIA NC. PT DID CALL FOR ASSIST TO
BATHROOM AND WILL CONTINUE TO DO SO. CONTINUE TO MONITOR CLOSELY. BED LOW,
CALL LIGHT IN REACH, SIDE RAILS X 2, HOB 10 DEGREES.

## 2017-04-30 NOTE — NUR
PT LYING IN BED ON RIGHT SIDE, EYES CLOSED, RESPIRATIONS EVEN AND UNLABORED.
PT EASILY ROUSABLE TO VERBAL STIMULI. CONTINUE TO MONITOR CLOSELY.

## 2017-04-30 NOTE — NUR
RESUMED CARE OF PT, LYING IN BED WITH EYES CLOSED RESPIRATIONS EVEN AND
UNLABORED ON 2LPM VIA NC.  RIGHT HAND INFUSING NS @ 75.  NO NEEDS NOTED AT
THIS TIME.  CALL LIGHT IN REACH.  WILL CONTINUE TO MONITOR.  SEE NURSE
ASSESSMENT.

## 2017-05-01 VITALS — SYSTOLIC BLOOD PRESSURE: 136 MMHG | DIASTOLIC BLOOD PRESSURE: 69 MMHG

## 2017-05-01 VITALS — SYSTOLIC BLOOD PRESSURE: 176 MMHG | DIASTOLIC BLOOD PRESSURE: 98 MMHG

## 2017-05-01 VITALS — SYSTOLIC BLOOD PRESSURE: 163 MMHG | DIASTOLIC BLOOD PRESSURE: 92 MMHG

## 2017-05-01 VITALS — DIASTOLIC BLOOD PRESSURE: 80 MMHG | SYSTOLIC BLOOD PRESSURE: 141 MMHG

## 2017-05-01 VITALS — SYSTOLIC BLOOD PRESSURE: 158 MMHG | DIASTOLIC BLOOD PRESSURE: 81 MMHG

## 2017-05-01 VITALS — SYSTOLIC BLOOD PRESSURE: 172 MMHG | DIASTOLIC BLOOD PRESSURE: 82 MMHG

## 2017-05-01 LAB
ANION GAP SERPL CALC-SCNC: 6.6 MMOL/L (ref 8–16)
BASOPHILS NFR BLD AUTO: 0.3 % (ref 0–2)
BUN SERPL-MCNC: 8 MG/DL (ref 7–18)
CALCIUM SERPL-MCNC: 8.5 MG/DL (ref 8.5–10.1)
CHLORIDE SERPL-SCNC: 104 MMOL/L (ref 98–107)
CO2 SERPL-SCNC: 35.8 MMOL/L (ref 21–32)
CREAT SERPL-MCNC: 0.7 MG/DL (ref 0.6–1.3)
EOSINOPHIL NFR BLD: 1.6 % (ref 0–7)
ERYTHROCYTE [DISTWIDTH] IN BLOOD BY AUTOMATED COUNT: 13.5 % (ref 11.5–14.5)
GLUCOSE SERPL-MCNC: 131 MG/DL (ref 74–106)
HCT VFR BLD CALC: 41.8 % (ref 36–48)
HGB BLD-MCNC: 12.6 G/DL (ref 12–16)
IMM GRANULOCYTES NFR BLD: 0.3 % (ref 0–5)
LYMPHOCYTES NFR BLD AUTO: 30.3 % (ref 15–50)
MAGNESIUM SERPL-MCNC: 1.9 MG/DL (ref 1.8–2.4)
MCH RBC QN AUTO: 30.7 PG (ref 26–34)
MCHC RBC AUTO-ENTMCNC: 30.1 G/DL (ref 31–37)
MCV RBC: 102 FL (ref 80–100)
MONOCYTES NFR BLD: 4 % (ref 2–11)
NEUTROPHILS NFR BLD AUTO: 63.5 % (ref 40–80)
OSMOLALITY SERPL CALC.SUM OF ELEC: 282 MOSM/KG (ref 275–300)
PHOSPHATE SERPL-MCNC: 4.1 MG/DL (ref 2.5–4.9)
PLATELET # BLD: 146 10X3/UL (ref 130–400)
PMV BLD AUTO: 11.8 FL (ref 7.4–10.4)
POTASSIUM SERPL-SCNC: 4.4 MMOL/L (ref 3.5–5.1)
RBC # BLD AUTO: 4.1 10X6/UL (ref 4–5.4)
SODIUM SERPL-SCNC: 142 MMOL/L (ref 136–145)
WBC # BLD AUTO: 6.8 10X3/UL (ref 4.8–10.8)

## 2017-05-01 NOTE — NUR
PT PULLED IV OUT CATH TIP INTACT. PT TO GO HOME TOMORROW. ASKED ALVARO HENRY IF
SHE WANTED ME TO RESITE SHE SAID NO.

## 2017-05-01 NOTE — NUR
Patient Name: JEANNINE BENDER Admission Status: ER
Accout number: [id number] Admission Date: 2017
: 1960 Admission Diagnosis:
Attending: CECI Current LOS: 3
 
Anticipated DC Date: 2017
Planned Disposition: Home
Primary Insurance: MEDICARE A & B
 
 
Discharge Planning Comments:
* Is the patient Alert and Oriented? Yes 0
* How many steps to enter\exit or inside your home? 2 0
* PCP DR. PARRY 0
* Pharmacy [facility] IN [place] 0
* Preadmission Environment Home with Family 0
* ADLs Independent 0
* Equipment Nebulizer
Oxygen 0
* Other Equipment HOME OXYGEN ONLY
[facility] - MEDICAL EQUIPMENT PROVIDER 0
* List name and contact numbers for known caregivers / representatives who
currently or will assist patient after discharge: HELLEN BENDER, COUSIN,
[phone] 0
* Community resources currently utilized None 0
* Please name any agencies selected above. NONE 0
* Additional services required to return to the preadmission environment? No 0
* Can the patient safely return to the preadmission environment? Yes 0
* Has this patient been hospitalized within the prior 30 days at any hospital?
Yes 0
 
CM ADVISED BY BEDSIDE NURSE THAT PT HAS DISCHARGE AND PT'S COUSIN DOES NOT
WANT TO PICK PT UP, CANNOT TAKE CARE OF HER AT HOME AND REPORTS WANTING PT
PLACED AT [facility]. CM SPOKE TO PT IN ROOM. PT REPORTS LIVING AT
HOME WITH HER COUSIN. PT REPORTS BEING INDEPENDENT IN HER CARE WITH MEDICAL
EQUIPMENT FROM [facility]. PT HAS HAD HOME HEALTH IN THE PAST AND REPORTS NOT
NEEDING THEM NOW. CM DISCUSSED AVAILABILITY OF MEDICAL EQUIPMENT AND REHAB
SERVICES. PT DENIES NEEDS. CM EXPLAINED THAT HER COUSIN CANNOT TAKE CARE OF
HER AND WANTS HER PLACED AT [facility]. PT REPORTS THAT HER COUSIN
WANTS PT TO INTO LONG TERM CARE AT THE NURSING HOME, PT IS THINKING OF IT BUT
HAS NOT YET MADE UP HER MIND. PT ASKED CM TO SEND INFORMATION TO [facility] TO ASSIST HER IF SHE DECIDES TO GO. PT REPORTS PLAN TO RETURN
HOME WITH HELLEN. PT SIGNED CHOICE LETTER. PT REPORTS IT IS OK TO DISCUSS HER
INFORMATION WITH [facility]LLEN. CM REVIEWED CHART, CALLED [facility] [phone]. CM
EXPLAINED THAT PT IS NOT HAVING ANY BALANCE ISSUES OR REHAB NEEDS AT THIS TIME
AND THAT LONG TERM CARE MEDICAID WOULD HAVE TO BE APPLIED FOR AND MAY TAKE UP
TO 45 DAYS TO PROCESS THE APPLICATION. HELLEN ASKED OLENA TO SEND INFORMATION TO
[facility] TO ASSIST IN THE PROCESS. HELLEN WILL  PT AND IS
ON THE WAY FROM [place]. CM FAXED REFERRAL REQUESTING [place] CONTACT PT IN
REGARDS TO POSSIBLE LONG TERM CARE ARRANGEMENTS FOR PT.
BEDSIDE NURSE NOTIFIED.
 
 
: Oumar Garcia

## 2017-05-01 NOTE — NUR
WENT OVER DC INSTRUCTIONS WITH PT PT VERBALIZES UNDERSTANDING. PT IS WAITING
ON COUSIN TO GET HERE AND THEN WILL GET PT A WHEELCHAIR DOWNSTAIRS.

## 2017-05-01 NOTE — NUR
CALLED TO PT ROOM, PT REQUESTING "HYDRO" AGAIN. I AGAIN EXPLAINED TO PT THAT
IT WAS NOT DUE UNTIL 1230. PT WAS TALKING TO ME WHILE I WAS HANGING HER FLUIDS
AND SHE FELL ASLEEP MID SENTENCE. PT IN NO S/S DISTRESS NOTED. WILL CONT TO
MONITOR.

## 2017-05-01 NOTE — NUR
DURING AM MED PASS, PT SNORING LOUDLY. HAD TO WAKE PT UP FOR MEDICATIONS. PT
CO HEADACHE AND REQUESTING "HYDRO". I TOLD PT THAT IT WAS NOT TIME FOR NORCO
UNTIL 1230 BECAUSE MED IS ORDERED Q8HP AND SHE HAD IT AT 0430. PT SAID " OK
WELL I AM ALSO VERY ANXIOUS COULD I HAVE AN ATIVAN." IT WAS TIME FOR ATIVAN SO
I ADMINISTERED TO PT FOR ANXIETY.

## 2017-05-01 NOTE — NUR
PT HAS ORDER FOR DC. PT REQUESTING I CALL HER COUSIN [facility] TO PICK HER UP
AND TAKE HER HOME. CALLED PT COUSIN [facility], [facility] SAYS THAT THEY WERE
LOOKING INTO SENDING PT TO [facility] R/T PT NOT BEING ABLE TO TAKE CARE OF
HERSELF AND [facility] NOT ABLE TO TAKE CARE OF HER ANYMORE.
 
SPOKE WITH ALVARO HENRY THEY ARE OK WITH THAT. SPOKE WITH [facility] CASE MANAGEMENT, HE
SAID IT IS NOT POSSIBLE WITH HER BEING OBS, BUT SHE CAN REQUEST LONG TERM CARE
THROUGH DR OFFICE. [facility] CALLING PT COUSIN TO EXPLAIN.

## 2018-06-13 ENCOUNTER — HOSPITAL ENCOUNTER (EMERGENCY)
Dept: HOSPITAL 84 - D.ER | Age: 58
Discharge: NURSING, REHAB OR LONG-TERM CARE | End: 2018-06-13
Payer: MEDICARE

## 2018-06-13 VITALS — SYSTOLIC BLOOD PRESSURE: 104 MMHG | DIASTOLIC BLOOD PRESSURE: 60 MMHG

## 2018-06-13 VITALS — HEIGHT: 63 IN | WEIGHT: 213.45 LBS | BODY MASS INDEX: 37.82 KG/M2

## 2018-06-13 DIAGNOSIS — Z99.81: ICD-10-CM

## 2018-06-13 DIAGNOSIS — J44.9: ICD-10-CM

## 2018-06-13 DIAGNOSIS — K21.9: Primary | ICD-10-CM

## 2018-06-13 DIAGNOSIS — J45.909: ICD-10-CM

## 2018-06-13 DIAGNOSIS — R10.13: ICD-10-CM

## 2018-06-13 DIAGNOSIS — R11.0: ICD-10-CM

## 2018-06-13 DIAGNOSIS — F17.200: ICD-10-CM

## 2018-06-13 DIAGNOSIS — E07.9: ICD-10-CM

## 2018-06-13 LAB
ALBUMIN SERPL-MCNC: 3.2 G/DL (ref 3.4–5)
ALP SERPL-CCNC: 63 U/L (ref 46–116)
ALT SERPL-CCNC: 35 U/L (ref 10–68)
ANION GAP SERPL CALC-SCNC: 4.4 MMOL/L (ref 8–16)
APPEARANCE UR: CLEAR
BACTERIA #/AREA URNS HPF: (no result) /HPF
BASOPHILS NFR BLD AUTO: 0.4 % (ref 0–2)
BILIRUB SERPL-MCNC: 0.3 MG/DL (ref 0.2–1.3)
BILIRUB SERPL-MCNC: NEGATIVE MG/DL
BUN SERPL-MCNC: 11 MG/DL (ref 7–18)
CALCIUM SERPL-MCNC: 8.4 MG/DL (ref 8.5–10.1)
CHLORIDE SERPL-SCNC: 104 MMOL/L (ref 98–107)
CK MB SERPL-MCNC: 0.5 U/L (ref 0–3.6)
CK SERPL-CCNC: 54 UL (ref 21–215)
CO2 SERPL-SCNC: 36.8 MMOL/L (ref 21–32)
COLOR UR: YELLOW
CREAT SERPL-MCNC: 0.8 MG/DL (ref 0.6–1.3)
EOSINOPHIL NFR BLD: 4.6 % (ref 0–7)
ERYTHROCYTE [DISTWIDTH] IN BLOOD BY AUTOMATED COUNT: 15.2 % (ref 11.5–14.5)
GLOBULIN SER-MCNC: 3.8 G/L
GLUCOSE SERPL-MCNC: 133 MG/DL (ref 74–106)
GLUCOSE SERPL-MCNC: NEGATIVE MG/DL
H PYLORI IGG SER IA-ACNC: NEGATIVE
HCT VFR BLD CALC: 38.8 % (ref 36–48)
HGB BLD-MCNC: 12.4 G/DL (ref 12–16)
IMM GRANULOCYTES NFR BLD: 0.2 % (ref 0–5)
KETONES UR STRIP-MCNC: NEGATIVE MG/DL
LYMPHOCYTES NFR BLD AUTO: 19 % (ref 15–50)
MCH RBC QN AUTO: 31 PG (ref 26–34)
MCHC RBC AUTO-ENTMCNC: 32 G/DL (ref 31–37)
MCV RBC: 97 FL (ref 80–100)
MONOCYTES NFR BLD: 7.3 % (ref 2–11)
MUCOUS THREADS #/AREA URNS LPF: (no result) /LPF
NEUTROPHILS NFR BLD AUTO: 68.5 % (ref 40–80)
NITRITE UR-MCNC: NEGATIVE MG/ML
OSMOLALITY SERPL CALC.SUM OF ELEC: 281 MOSM/KG (ref 275–300)
PH UR STRIP: 9 [PH] (ref 5–6)
PLATELET # BLD: 118 10X3/UL (ref 130–400)
PMV BLD AUTO: 11.1 FL (ref 7.4–10.4)
POTASSIUM SERPL-SCNC: 4.2 MMOL/L (ref 3.5–5.1)
PROT SERPL-MCNC: 7 G/DL (ref 6.4–8.2)
PROT UR-MCNC: NEGATIVE MG/DL
RBC # BLD AUTO: 4 10X6/UL (ref 4–5.4)
SODIUM SERPL-SCNC: 141 MMOL/L (ref 136–145)
SP GR UR STRIP: 1 (ref 1–1.02)
SQUAMOUS #/AREA URNS HPF: (no result) /HPF (ref 0–5)
TROPONIN I SERPL-MCNC: < 0.017 NG/ML (ref 0–0.06)
UROBILINOGEN UR-MCNC: NORMAL MG/DL
WBC # BLD AUTO: 5 10X3/UL (ref 4.8–10.8)
WBC #/AREA URNS HPF: (no result) /HPF (ref 0–5)